# Patient Record
Sex: MALE
[De-identification: names, ages, dates, MRNs, and addresses within clinical notes are randomized per-mention and may not be internally consistent; named-entity substitution may affect disease eponyms.]

---

## 2020-07-27 PROBLEM — Z00.00 ENCOUNTER FOR PREVENTIVE HEALTH EXAMINATION: Status: ACTIVE | Noted: 2020-07-27

## 2020-08-20 ENCOUNTER — APPOINTMENT (OUTPATIENT)
Dept: SURGERY | Facility: CLINIC | Age: 30
End: 2020-08-20
Payer: COMMERCIAL

## 2020-08-20 PROCEDURE — 99201 OFFICE OUTPATIENT NEW 10 MINUTES: CPT

## 2020-08-20 NOTE — ASSESSMENT
[FreeTextEntry1] : Likely is lipoma rather than a cyst. Fully explained to patient that can be observed or removed. Prefers removal because it does cause discomfort.

## 2020-08-20 NOTE — HISTORY OF PRESENT ILLNESS
[de-identified] : Presents for cyst on back present for several years but now causing discomfort when exercises or gets a massage.No significant past medical history.

## 2020-08-26 ENCOUNTER — APPOINTMENT (OUTPATIENT)
Dept: SURGERY | Facility: CLINIC | Age: 30
End: 2020-08-26
Payer: COMMERCIAL

## 2020-08-26 ENCOUNTER — LABORATORY RESULT (OUTPATIENT)
Age: 30
End: 2020-08-26

## 2020-08-26 PROCEDURE — 11402 EXC TR-EXT B9+MARG 1.1-2 CM: CPT

## 2020-08-26 PROCEDURE — 12031 INTMD RPR S/A/T/EXT 2.5 CM/<: CPT

## 2020-08-26 NOTE — ASSESSMENT
[FreeTextEntry1] : Under sterile conditions with 1% Xylocaine solution incision was made over the palpable area with a 15 blade scalpel. Incision was carried down through subcutaneous tissue into the deeper fat layers and there was no visible globular fat noted. Dissection throughout the tissue into the deeper layers revealed no significant abnormality in this area. I therefore excised with sharp excision some of the more globular fat in this region and sent it in formalin to pathology. The deep subcutaneous tissue was then approximated with 3-0 Vicryl interrupted suture and the skin was approximated with 4-0 nylon running subcuticular closure benzoin chest as a sterile dressings were applied

## 2020-08-26 NOTE — PHYSICAL EXAM
[de-identified] : Left mid back as a topical rubbery nodule that appears to be in the subcutaneous tissue. Approximately 1.5-2 cm in size.

## 2020-09-03 ENCOUNTER — APPOINTMENT (OUTPATIENT)
Dept: SURGERY | Facility: CLINIC | Age: 30
End: 2020-09-03
Payer: COMMERCIAL

## 2020-09-03 PROCEDURE — 99024 POSTOP FOLLOW-UP VISIT: CPT

## 2020-09-03 NOTE — PHYSICAL EXAM
[de-identified] : Wound well healed. No mass or collection. No sign of infection. Suture removed. Strips replaced w/ benzoin